# Patient Record
Sex: MALE | Race: WHITE | URBAN - METROPOLITAN AREA
[De-identification: names, ages, dates, MRNs, and addresses within clinical notes are randomized per-mention and may not be internally consistent; named-entity substitution may affect disease eponyms.]

---

## 2020-07-06 ENCOUNTER — NURSE ONLY (OUTPATIENT)
Dept: PRIMARY CARE CLINIC | Age: 65
End: 2020-07-06

## 2020-07-06 PROCEDURE — 99211 OFF/OP EST MAY X REQ PHY/QHP: CPT | Performed by: NURSE PRACTITIONER

## 2020-07-10 LAB
SARS-COV-2: NOT DETECTED
SOURCE: NORMAL

## 2020-07-18 ENCOUNTER — NURSE TRIAGE (OUTPATIENT)
Dept: OTHER | Facility: CLINIC | Age: 65
End: 2020-07-18

## 2020-07-19 NOTE — TELEPHONE ENCOUNTER
Reason for Disposition   Sharp object  (e.g., needle, nail, safety pin, toothpick, bone, bottle cap, pull tab, dental bridge work)     (Exception: tiny chips of glass generally pass without any symptoms)    Protocols used: SWALLOWED FOREIGN BODY-ADULT-    Caller states they are in [address]. Her  swallowed a pop tab, accidentally. He felt the tab go down his throat. Denies feeling it lodged in throat, difficulty swallowing. Recommendation for swallowing a pull tab (from 12 oz coca cola can) is to proceed to the ED for further evaluation. Pulled from [address] [place] [address] website - [facility] JUAN CARLOS is in [address] [place] [address],  [phone]  - [facility]. Call back for worsening symptoms or questions.